# Patient Record
Sex: MALE | Race: WHITE
[De-identification: names, ages, dates, MRNs, and addresses within clinical notes are randomized per-mention and may not be internally consistent; named-entity substitution may affect disease eponyms.]

---

## 2021-08-22 NOTE — ED
ENT HPI





- General


Chief complaint: Dental/Oral


Stated complaint: Post-op bleeding


Time Seen by Provider: 08/22/21 05:07


Source: patient, RN notes reviewed, old records reviewed


Mode of arrival: wheelchair


Limitations: no limitations





- History of Present Illness


Initial comments: 





This is a 65-year-old male to the for evaluation patient presents today for 

evaluation regards to significant amount of bleeding from her gums.  Patient 

does have a significant amount of bleeding and pain in his mouth with recent 

extraction of all teeth.  His teeth were extracted for application of dentures 

but has been bleeding throughout the last 1520 hrs. on and off


MD complaint: other (Bleeding from his gums)


-: hour(s) (20)


Severity: moderate


Severity scale (1-10): 5


Quality: burning


Consistency: constant


Improves with: none


Worsens with: none


Context-Epistaxis: history of similar


Context- Ear: recent illness


Associated Symptoms: gum swelling (And bleeding)





- Related Data


                                    Allergies











Allergy/AdvReac Type Severity Reaction Status Date / Time


 


codeine AdvReac  Nausea & Verified 08/22/21 04:54





   Vomiting  














Review of Systems


ROS Statement: 


Those systems with pertinent positive or pertinent negative responses have been 

documented in the HPI.





ROS Other: All systems not noted in ROS Statement are negative.





Past Medical History


Past Medical History: Asthma


History of Any Multi-Drug Resistant Organisms: None Reported


Past Surgical History: Orthopedic Surgery


Past Psychological History: No Psychological Hx Reported


Smoking Status: Light tobacco smoker


Past Alcohol Use History: None Reported


Past Drug Use History: Marijuana





General Exam


General appearance: alert, in no apparent distress


Head exam: Present: atraumatic, normocephalic, normal inspection


Eye exam: Present: normal appearance, PERRL, EOMI.  Absent: scleral icterus, 

conjunctival injection, periorbital swelling


ENT exam: Present: normal exam, mucous membranes moist


Neck exam: Present: normal inspection.  Absent: tenderness, meningismus, 

lymphadenopathy


Respiratory exam: Present: normal lung sounds bilaterally.  Absent: respiratory 

distress, wheezes, rales, rhonchi, stridor


Cardiovascular Exam: Present: regular rate, normal rhythm, normal heart sounds. 

Absent: systolic murmur, diastolic murmur, rubs, gallop, clicks


GI/Abdominal exam: Present: soft, normal bowel sounds.  Absent: distended, ten

derness, guarding, rebound, rigid


Extremities exam: Present: normal inspection, full ROM, normal capillary refill.

 Absent: tenderness, pedal edema, joint swelling, calf tenderness


Back exam: Present: normal inspection


Neurological exam: Present: alert, oriented X3, CN II-XII intact


Psychiatric exam: Present: normal affect, normal mood


Skin exam: Present: warm, dry, intact, normal color.  Absent: rash





Course


                                   Vital Signs











  08/22/21 08/22/21





  04:49 07:28


 


Temperature 98.7 F 98.5 F


 


Pulse Rate 100 91


 


Respiratory 18 18





Rate  


 


Blood Pressure 150/102 141/96


 


O2 Sat by Pulse 98 98





Oximetry  














- Reevaluation(s)


Reevaluation #1: 





08/22/21 05:41


Medical record is reviewed





Medical Decision Making





- Medical Decision Making





65 male with bleeding gums after teeth were all removed.  Patient continued and 

persistent bleeding here in the emergency department, although that is resolved 

with ice water rinses and flushes.  At this time patient can be discharged home





Disposition


Clinical Impression: 


 Dental caries, Bleeding gums, Post-operative haemorrhage





Disposition: HOME SELF-CARE


Condition: Good


Instructions (If sedation given, give patient instructions):  Toothache (ED)


Is patient prescribed a controlled substance at d/c from ED?: No


Referrals: 


None,Stated [Primary Care Provider] - 1-2 days

## 2022-12-16 ENCOUNTER — HOSPITAL ENCOUNTER (OUTPATIENT)
Dept: HOSPITAL 47 - RADCTMAIN | Age: 67
Discharge: HOME | End: 2022-12-16
Attending: OTOLARYNGOLOGY
Payer: MEDICARE

## 2022-12-16 DIAGNOSIS — J33.9: Primary | ICD-10-CM

## 2022-12-16 DIAGNOSIS — J34.89: ICD-10-CM

## 2022-12-16 PROCEDURE — 70486 CT MAXILLOFACIAL W/O DYE: CPT

## 2022-12-17 NOTE — CT
EXAMINATION TYPE: CT sinus wo con

CT DLP: 667.30 mGycm, Automated exposure control for dose reduction was used.

 

DATE OF EXAM: 12/16/2022 4:58 PM

 

COMPARISON: None.  

 

CLINICAL INDICATION:Male, 67 years old with history of J33.9 NASAL POLYP, UNSPECIFIED; PHH, NASAL LANEY
YP. ringing in ears

 

 

TECHNIQUE: Multiple thin axial images were obtained through the paranasal sinuses without the use of 
IV contrast. Additional coronal and sagittal reformatted images were submitted for evaluation. 

Contrast used: none

Oral contrast used: none

 

FINDINGS: 

 

Frontal sinuses: Normally developed, bilateral scattered mucosal thickening.. Frontal Recess: Opacifi
ed

Modified Neo-Иван Score: Right 1 = 1-25% Opacified, Left 1 = 1-25% Opacified

 

Maxillary Sinuses: Normally developed with mucosal thickening bilaterally..

Modified Neo-[place] Score: Right 1 = 1-25% Opacified, Left 1 = 1-25% Opacified

 

Maxillary Infundibula(OMC): Partially obstructed due to mucosal thickening, osseous spurring. 

Modified [place] Score: Right 1 = Partially obstructed, Left 1 = Partially obstructed

 

Ethmoid sinuses: Normally developed scattered mucosal thickening.. Ethmoidal notch: Unprotected bilat
eral anterior ethmoidal arteries.

Modified Neo-Иван Score: 

Anterior Right 1 = 1-25% Opacified, Left 1 = 1-25% Opacified, 

Posterior Right 1 = 1-25% Opacified, Left 1 = 1-25% Opacified, 

 

Sphenoid sinuses: Normally developed with mucosal thickening. There is sellar sphenoid sinus pneumati
zation without evidence of dehiscence.  No dehiscence of carotid canal. No evidence of optic nerve de
hiscence within the sphenoid sinus. No evidence of Onodi cells.  Sphenoethmoidal recesses: Partially 
obstructed with mucosal thickening..

Modified [place] Score: Right 1 = 1-25% Opacified, Left 1 = 1-25% Opacified.

 

Nasal septum: Within normal limits..

Nasal Turbinates: Nasal polyp situated between the right inferior middle turbinate measuring up to 2.
3 x 2.2 x 1.0 cm.

Mastoid air cells & middle ears: The middle ears are grossly unremarkable. Decreased pneumatization o
f the right temporal bone

Modified Soft tissues & Brain: Partially seen without gross abnormality. Globes are intact.

 

Other: 

Cribriform plate demonstrates symmetric  Keros classification type 3 cribriform plate. No evidence of
 bony dehiscence of skull base.

 

Lamina papyracea is intact without evidence of remote orbital fracture or orbital prolapse into the e
thmoid sinus. 

 

IMPRESSION:

1. Moderate mucosal sinus disease.

 

2. The ostiomeatal units and sphenoethmoidal recesses are partially obstructed. frontonasal recess is
 obstructed

 

3. Opacification burden of 12/54 on the Modified [place] scoring system.

 

4. Right nasal cavity polyp.

## 2024-11-12 ENCOUNTER — HOSPITAL ENCOUNTER (OUTPATIENT)
Dept: HOSPITAL 47 - LABWHC1 | Age: 69
Discharge: HOME | End: 2024-11-12
Attending: INTERNAL MEDICINE
Payer: MEDICARE

## 2024-11-12 DIAGNOSIS — B18.2: Primary | ICD-10-CM

## 2024-11-12 LAB
AFP-TM SERPL-MCNC: <3 NG/ML (ref 0–7.9)
ALBUMIN SERPL-MCNC: 4.8 G/DL (ref 3.8–4.9)
ALBUMIN/GLOB SERPL: 1.66 RATIO (ref 1.6–3.17)
ALP SERPL-CCNC: 69 U/L (ref 41–126)
ALT SERPL-CCNC: 93 U/L (ref 10–49)
ANION GAP SERPL CALC-SCNC: 11 MMOL/L (ref 4–12)
AST SERPL-CCNC: 51 U/L (ref 14–35)
BASOPHILS # BLD AUTO: 0.17 X 10*3/UL (ref 0–0.1)
BASOPHILS NFR BLD AUTO: 1.2 %
BUN SERPL-SCNC: 16 MG/DL (ref 9–27)
BUN/CREAT SERPL: 14.55 RATIO (ref 12–20)
CALCIUM SPEC-MCNC: 9.9 MG/DL (ref 8.7–10.3)
CHLORIDE SERPL-SCNC: 104 MMOL/L (ref 96–109)
CO2 SERPL-SCNC: 25 MMOL/L (ref 21.6–31.8)
EOSINOPHIL # BLD AUTO: 0.16 X 10*3/UL (ref 0.04–0.35)
EOSINOPHIL NFR BLD AUTO: 1.1 %
ERYTHROCYTE [DISTWIDTH] IN BLOOD BY AUTOMATED COUNT: 6.08 X 10*6/UL (ref 4.4–5.6)
ERYTHROCYTE [DISTWIDTH] IN BLOOD: 13 % (ref 11.5–14.5)
GLOBULIN SER CALC-MCNC: 2.9 G/DL (ref 1.6–3.3)
GLUCOSE SERPL-MCNC: 187 MG/DL (ref 70–110)
HCT VFR BLD AUTO: 53.3 % (ref 39.6–50)
HGB BLD-MCNC: 18.2 G/DL (ref 13–17)
IMM GRANULOCYTES BLD QL AUTO: 0.7 %
LYMPHOCYTES # SPEC AUTO: 2.55 X 10*3/UL (ref 0.9–5)
LYMPHOCYTES NFR SPEC AUTO: 17.5 %
MCH RBC QN AUTO: 29.9 PG (ref 27–32)
MCHC RBC AUTO-ENTMCNC: 34.1 G/DL (ref 32–37)
MCV RBC AUTO: 87.7 FL (ref 80–97)
MONOCYTES # BLD AUTO: 0.89 X 10*3/UL (ref 0.2–1)
MONOCYTES NFR BLD AUTO: 6.1 %
NEUTROPHILS # BLD AUTO: 10.69 X 10*3/UL (ref 1.8–7.7)
NEUTROPHILS NFR BLD AUTO: 73.4 %
NRBC BLD AUTO-RTO: 0 X 10*3/UL (ref 0–0.01)
PLATELET # BLD AUTO: 141 X 10*3/UL (ref 140–440)
POTASSIUM SERPL-SCNC: 5.4 MMOL/L (ref 3.5–5.5)
PROT SERPL-MCNC: 7.7 G/DL (ref 6.2–8.2)
SODIUM SERPL-SCNC: 140 MMOL/L (ref 135–145)
WBC # BLD AUTO: 14.56 X 10*3/UL (ref 4.5–10)

## 2024-11-12 PROCEDURE — 87522 HEPATITIS C REVRS TRNSCRPJ: CPT

## 2024-11-12 PROCEDURE — 85025 COMPLETE CBC W/AUTO DIFF WBC: CPT

## 2024-11-12 PROCEDURE — 81596 NFCT DS CHRNC HCV 6 ASSAYS: CPT

## 2024-11-12 PROCEDURE — 80053 COMPREHEN METABOLIC PANEL: CPT

## 2024-11-12 PROCEDURE — 82105 ALPHA-FETOPROTEIN SERUM: CPT

## 2024-11-12 PROCEDURE — 36415 COLL VENOUS BLD VENIPUNCTURE: CPT

## 2024-11-25 ENCOUNTER — HOSPITAL ENCOUNTER (OUTPATIENT)
Dept: HOSPITAL 47 - RADUSWWP | Age: 69
Discharge: HOME | End: 2024-11-25
Attending: INTERNAL MEDICINE
Payer: MEDICARE

## 2024-11-25 DIAGNOSIS — N13.39: Primary | ICD-10-CM

## 2024-11-25 DIAGNOSIS — B18.2: ICD-10-CM

## 2024-11-25 PROCEDURE — 76705 ECHO EXAM OF ABDOMEN: CPT

## 2024-12-27 ENCOUNTER — HOSPITAL ENCOUNTER (OUTPATIENT)
Dept: HOSPITAL 47 - RADMRIMAIN | Age: 69
Discharge: HOME | End: 2024-12-27
Attending: INTERNAL MEDICINE
Payer: MEDICARE

## 2024-12-27 DIAGNOSIS — B18.2: ICD-10-CM

## 2024-12-27 DIAGNOSIS — K76.9: ICD-10-CM

## 2024-12-27 DIAGNOSIS — R93.89: ICD-10-CM

## 2024-12-27 DIAGNOSIS — N28.1: Primary | ICD-10-CM

## 2024-12-27 LAB
ALBUMIN SERPL-MCNC: 4.6 G/DL (ref 3.8–4.9)
ALBUMIN/GLOB SERPL: 1.7 RATIO (ref 1.6–3.17)
ALP SERPL-CCNC: 63 U/L (ref 41–126)
ALT SERPL-CCNC: 18 U/L (ref 10–49)
ANION GAP SERPL CALC-SCNC: 12.2 MMOL/L (ref 4–12)
AST SERPL-CCNC: 18 U/L (ref 14–35)
BASOPHILS # BLD AUTO: 0.15 X 10*3/UL (ref 0–0.1)
BASOPHILS NFR BLD AUTO: 1 %
BUN SERPL-SCNC: 14.9 MG/DL (ref 9–27)
BUN/CREAT SERPL: 14.9 RATIO (ref 12–20)
CALCIUM SPEC-MCNC: 9.5 MG/DL (ref 8.7–10.3)
CHLORIDE SERPL-SCNC: 103 MMOL/L (ref 96–109)
CO2 SERPL-SCNC: 23.8 MMOL/L (ref 21.6–31.8)
EOSINOPHIL # BLD AUTO: 0.23 X 10*3/UL (ref 0.04–0.35)
EOSINOPHIL NFR BLD AUTO: 1.6 %
ERYTHROCYTE [DISTWIDTH] IN BLOOD BY AUTOMATED COUNT: 6.19 X 10*6/UL (ref 4.4–5.6)
ERYTHROCYTE [DISTWIDTH] IN BLOOD: 12.9 % (ref 11.5–14.5)
GLOBULIN SER CALC-MCNC: 2.7 G/DL (ref 1.6–3.3)
GLUCOSE SERPL-MCNC: 192 MG/DL (ref 70–110)
HCT VFR BLD AUTO: 53.1 % (ref 39.6–50)
HGB BLD-MCNC: 18.4 G/DL (ref 13–17)
IMM GRANULOCYTES BLD QL AUTO: 1 %
LYMPHOCYTES # SPEC AUTO: 2.59 X 10*3/UL (ref 0.9–5)
LYMPHOCYTES NFR SPEC AUTO: 17.7 %
MCH RBC QN AUTO: 29.7 PG (ref 27–32)
MCHC RBC AUTO-ENTMCNC: 34.7 G/DL (ref 32–37)
MCV RBC AUTO: 85.8 FL (ref 80–97)
MONOCYTES # BLD AUTO: 0.92 X 10*3/UL (ref 0.2–1)
MONOCYTES NFR BLD AUTO: 6.3 %
NEUTROPHILS # BLD AUTO: 10.64 X 10*3/UL (ref 1.8–7.7)
NEUTROPHILS NFR BLD AUTO: 72.4 %
NRBC BLD AUTO-RTO: 0 X 10*3/UL (ref 0–0.01)
PLATELET # BLD AUTO: 130 X 10*3/UL (ref 140–440)
POTASSIUM SERPL-SCNC: 4.6 MMOL/L (ref 3.5–5.5)
PROT SERPL-MCNC: 7.3 G/DL (ref 6.2–8.2)
SODIUM SERPL-SCNC: 139 MMOL/L (ref 135–145)
WBC # BLD AUTO: 14.67 X 10*3/UL (ref 4.5–10)

## 2024-12-27 PROCEDURE — 74183 MRI ABD W/O CNTR FLWD CNTR: CPT

## 2024-12-27 PROCEDURE — 80053 COMPREHEN METABOLIC PANEL: CPT

## 2024-12-27 PROCEDURE — 87521 HEPATITIS C PROBE&RVRS TRNSC: CPT

## 2024-12-27 PROCEDURE — 85025 COMPLETE CBC W/AUTO DIFF WBC: CPT

## 2024-12-27 NOTE — MR
EXAMINATION TYPE: MR abdomen wo/w con

 

DATE OF EXAM: 12/27/2024 2:15 PM

 

INDICATION: 

Patient age:Male;  69 years old; 

Reason for study: R93.89 ABNORMAL FINDINGS ON DX IMAGING OF OT B18.2; PHH. Hepatitis.

 

COMPARISON: Ultrasound liver 11/25/2024

 

TECHNIQUE:  Multiplanar multi-sequence imaging was performed without and with IV contrast. The patien
t was given 8 ccs of Gadobutrol intravenously and dynamic imaging was performed. Post IV contrast sub
traction images were also submitted for review.

     

FINDINGS: 

LOWER CHEST: No gross irregularity.

 

ABDOMEN

Liver: Noncirrhotic morphology. No fatty infiltration.  Subcentimeter 8 mm nonrim enhancing focus wit
hin the inferior right hepatic lobe tip without washout (series 902, image 34).

Gallbladder and Bile ducts: Unremarkable.

Pancreas: Unremarkable. 

Spleen: Unremarkable.

Adrenal glands: Unremarkable.

Kidneys: No hydronephrosis. Left lower pole T2 hyperintense thin wall 3.7 cm cyst with thin septation
. Additional subcentimeter T2 hyperintense nonenhancing left upper pole cortical cyst. Exophytic righ
t renal lower pole 1.9 cm T2 hyperintense thin-walled cyst. Large right renal sinus cyst with thin wa
lls measuring up to 7.2 cm. Dilated right renal upper pole calyx which demonstrates contrast on delay
ed imaging.

Stomach and Bowel: Bowel obstruction. Scattered distal colonic diverticulosis without evidence for ac
[place] diverticulitis.

Peritoneum:  No evidence of pneumoperitoneum, free fluid, or adenopathy.

Vasculature: Unremarkable. No aortic aneurysm.

Abdominal wall: Unremarkable.

Musculoskeletal: The osseous structures appear intact. 

 

IMPRESSION:

1.  Noncirrhotic morphology of the liver with an indeterminate subcentimeter enhancing focus within t
he tip of the inferior right hepatic lobe. No definitive washout. Continued follow-up is recommended 
with follow-up MR abdomen in 6 months.

2.  Bilateral renal cysts with large right renal sinus cyst. No suspicious enhancement. No hydronephr
osis however there is a dilated right upper pole renal calyx which may be related to mass effect from
 renal sinus cyst versus other etiologies.

 

[facility], Workstation: [id number], 12/27/2024 7:15 PM

## 2025-03-19 ENCOUNTER — HOSPITAL ENCOUNTER (OUTPATIENT)
Dept: HOSPITAL 47 - LABWHC1 | Age: 70
Discharge: HOME | End: 2025-03-19
Attending: INTERNAL MEDICINE
Payer: MEDICARE

## 2025-03-19 DIAGNOSIS — B18.2: Primary | ICD-10-CM

## 2025-03-19 LAB
ALBUMIN SERPL-MCNC: 4.2 G/DL (ref 3.8–4.9)
ALBUMIN/GLOB SERPL: 1.62 RATIO (ref 1.6–3.17)
ALP SERPL-CCNC: 65 U/L (ref 41–126)
ALT SERPL-CCNC: 29 U/L (ref 10–49)
ANION GAP SERPL CALC-SCNC: 13.6 MMOL/L (ref 4–12)
AST SERPL-CCNC: 39 U/L (ref 14–35)
BASOPHILS # BLD AUTO: 0.13 X 10*3/UL (ref 0–0.1)
BASOPHILS NFR BLD AUTO: 1.3 %
BUN SERPL-SCNC: 22.4 MG/DL (ref 9–27)
BUN/CREAT SERPL: 18.67 RATIO (ref 12–20)
CALCIUM SPEC-MCNC: 9.2 MG/DL (ref 8.7–10.3)
CHLORIDE SERPL-SCNC: 102 MMOL/L (ref 96–109)
CO2 SERPL-SCNC: 20.4 MMOL/L (ref 21.6–31.8)
EOSINOPHIL # BLD AUTO: 0.33 X 10*3/UL (ref 0.04–0.35)
EOSINOPHIL NFR BLD AUTO: 3.2 %
ERYTHROCYTE [DISTWIDTH] IN BLOOD BY AUTOMATED COUNT: 5.72 X 10*6/UL (ref 4.4–5.6)
ERYTHROCYTE [DISTWIDTH] IN BLOOD: 13.2 % (ref 11.5–14.5)
GLOBULIN SER CALC-MCNC: 2.6 G/DL (ref 1.6–3.3)
GLUCOSE SERPL-MCNC: 215 MG/DL (ref 70–110)
HCT VFR BLD AUTO: 49.2 % (ref 39.6–50)
HGB BLD-MCNC: 16.9 G/DL (ref 13–17)
IMM GRANULOCYTES BLD QL AUTO: 0.8 %
LYMPHOCYTES # SPEC AUTO: 1.64 X 10*3/UL (ref 0.9–5)
LYMPHOCYTES NFR SPEC AUTO: 15.9 %
MCH RBC QN AUTO: 29.5 PG (ref 27–32)
MCHC RBC AUTO-ENTMCNC: 34.3 G/DL (ref 32–37)
MCV RBC AUTO: 86 FL (ref 80–97)
MONOCYTES # BLD AUTO: 1.54 X 10*3/UL (ref 0.2–1)
MONOCYTES NFR BLD AUTO: 15 %
NEUTROPHILS # BLD AUTO: 6.57 X 10*3/UL (ref 1.8–7.7)
NEUTROPHILS NFR BLD AUTO: 63.8 %
NRBC BLD AUTO-RTO: 0 X 10*3/UL (ref 0–0.01)
PLATELET # BLD AUTO: 101 X 10*3/UL (ref 140–440)
POTASSIUM SERPL-SCNC: 4.2 MMOL/L (ref 3.5–5.5)
PROT SERPL-MCNC: 6.8 G/DL (ref 6.2–8.2)
SODIUM SERPL-SCNC: 136 MMOL/L (ref 135–145)
WBC # BLD AUTO: 10.29 X 10*3/UL (ref 4.5–10)

## 2025-03-19 PROCEDURE — 87522 HEPATITIS C REVRS TRNSCRPJ: CPT

## 2025-03-19 PROCEDURE — 80053 COMPREHEN METABOLIC PANEL: CPT

## 2025-03-19 PROCEDURE — 36415 COLL VENOUS BLD VENIPUNCTURE: CPT

## 2025-03-19 PROCEDURE — 85025 COMPLETE CBC W/AUTO DIFF WBC: CPT

## 2025-06-23 ENCOUNTER — HOSPITAL ENCOUNTER (OUTPATIENT)
Dept: HOSPITAL 47 - RADMRIMAIN | Age: 70
Discharge: HOME | End: 2025-06-23
Attending: INTERNAL MEDICINE
Payer: MEDICARE

## 2025-06-23 DIAGNOSIS — K57.30: ICD-10-CM

## 2025-06-23 DIAGNOSIS — R93.89: ICD-10-CM

## 2025-06-23 DIAGNOSIS — K76.0: Primary | ICD-10-CM

## 2025-06-23 DIAGNOSIS — N28.1: ICD-10-CM

## 2025-06-23 PROCEDURE — 74183 MRI ABD W/O CNTR FLWD CNTR: CPT
